# Patient Record
Sex: MALE | Race: WHITE | NOT HISPANIC OR LATINO | URBAN - METROPOLITAN AREA
[De-identification: names, ages, dates, MRNs, and addresses within clinical notes are randomized per-mention and may not be internally consistent; named-entity substitution may affect disease eponyms.]

---

## 2021-07-16 ENCOUNTER — IMMUNIZATION (OUTPATIENT)
Dept: FAMILY MEDICINE | Facility: CLINIC | Age: 46
End: 2021-07-16
Payer: COMMERCIAL

## 2021-07-16 DIAGNOSIS — Z23 NEED FOR VACCINATION: Primary | ICD-10-CM

## 2021-07-16 PROCEDURE — 0001A COVID-19, MRNA, LNP-S, PF, 30 MCG/0.3 ML DOSE VACCINE: CPT | Mod: PBBFAC | Performed by: FAMILY MEDICINE

## 2021-08-06 ENCOUNTER — IMMUNIZATION (OUTPATIENT)
Dept: FAMILY MEDICINE | Facility: CLINIC | Age: 46
End: 2021-08-06
Payer: COMMERCIAL

## 2021-08-06 DIAGNOSIS — Z23 NEED FOR VACCINATION: Primary | ICD-10-CM

## 2021-08-06 PROCEDURE — 0002A COVID-19, MRNA, LNP-S, PF, 30 MCG/0.3 ML DOSE VACCINE: CPT | Mod: CV19,,, | Performed by: FAMILY MEDICINE

## 2021-08-06 PROCEDURE — 0002A COVID-19, MRNA, LNP-S, PF, 30 MCG/0.3 ML DOSE VACCINE: ICD-10-PCS | Mod: CV19,,, | Performed by: FAMILY MEDICINE

## 2021-08-06 PROCEDURE — 91300 COVID-19, MRNA, LNP-S, PF, 30 MCG/0.3 ML DOSE VACCINE: CPT | Mod: ,,, | Performed by: FAMILY MEDICINE

## 2021-08-06 PROCEDURE — 91300 COVID-19, MRNA, LNP-S, PF, 30 MCG/0.3 ML DOSE VACCINE: ICD-10-PCS | Mod: ,,, | Performed by: FAMILY MEDICINE

## 2022-07-08 ENCOUNTER — OFFICE VISIT (OUTPATIENT)
Dept: FAMILY MEDICINE | Facility: CLINIC | Age: 47
End: 2022-07-08
Payer: COMMERCIAL

## 2022-07-08 ENCOUNTER — CLINICAL SUPPORT (OUTPATIENT)
Dept: FAMILY MEDICINE | Facility: CLINIC | Age: 47
End: 2022-07-08
Payer: COMMERCIAL

## 2022-07-08 VITALS
SYSTOLIC BLOOD PRESSURE: 126 MMHG | HEIGHT: 70 IN | TEMPERATURE: 98 F | RESPIRATION RATE: 16 BRPM | WEIGHT: 179.63 LBS | BODY MASS INDEX: 25.72 KG/M2 | HEART RATE: 56 BPM | OXYGEN SATURATION: 97 % | DIASTOLIC BLOOD PRESSURE: 72 MMHG

## 2022-07-08 DIAGNOSIS — Z82.49 FAMILY HISTORY OF HEART DISEASE: ICD-10-CM

## 2022-07-08 DIAGNOSIS — K21.9 GASTROESOPHAGEAL REFLUX DISEASE, UNSPECIFIED WHETHER ESOPHAGITIS PRESENT: ICD-10-CM

## 2022-07-08 DIAGNOSIS — Z13.6 ENCOUNTER FOR LIPID SCREENING FOR CARDIOVASCULAR DISEASE: ICD-10-CM

## 2022-07-08 DIAGNOSIS — R53.83 FATIGUE, UNSPECIFIED TYPE: ICD-10-CM

## 2022-07-08 DIAGNOSIS — Z79.899 ENCOUNTER FOR LONG-TERM (CURRENT) USE OF MEDICATIONS: ICD-10-CM

## 2022-07-08 DIAGNOSIS — K80.20 CALCULUS OF GALLBLADDER WITHOUT CHOLECYSTITIS WITHOUT OBSTRUCTION: ICD-10-CM

## 2022-07-08 DIAGNOSIS — R94.31 ABNORMAL EKG: ICD-10-CM

## 2022-07-08 DIAGNOSIS — Z00.00 ENCOUNTER FOR MEDICAL EXAMINATION TO ESTABLISH CARE: ICD-10-CM

## 2022-07-08 DIAGNOSIS — Z00.00 ENCOUNTER FOR MEDICAL EXAMINATION TO ESTABLISH CARE: Primary | ICD-10-CM

## 2022-07-08 DIAGNOSIS — Z13.220 ENCOUNTER FOR LIPID SCREENING FOR CARDIOVASCULAR DISEASE: ICD-10-CM

## 2022-07-08 DIAGNOSIS — B35.1 ONYCHOMYCOSIS: ICD-10-CM

## 2022-07-08 DIAGNOSIS — R93.89 ABNORMAL CT SCAN: ICD-10-CM

## 2022-07-08 DIAGNOSIS — Z12.11 COLON CANCER SCREENING: ICD-10-CM

## 2022-07-08 DIAGNOSIS — Z12.5 ENCOUNTER FOR PROSTATE CANCER SCREENING: ICD-10-CM

## 2022-07-08 LAB
BILIRUB UR QL STRIP: NEGATIVE
CLARITY UR: CLEAR
COLOR UR: YELLOW
GLUCOSE UR QL STRIP: NEGATIVE
HGB UR QL STRIP: NEGATIVE
KETONES UR QL STRIP: NEGATIVE
LEUKOCYTE ESTERASE UR QL STRIP: NEGATIVE
NITRITE UR QL STRIP: NEGATIVE
PH UR STRIP: 6 [PH] (ref 5–8)
PROT UR QL STRIP: NEGATIVE
SP GR UR STRIP: 1.02 (ref 1–1.03)
URN SPEC COLLECT METH UR: NORMAL

## 2022-07-08 PROCEDURE — 3074F PR MOST RECENT SYSTOLIC BLOOD PRESSURE < 130 MM HG: ICD-10-PCS | Mod: CPTII,S$GLB,, | Performed by: FAMILY MEDICINE

## 2022-07-08 PROCEDURE — 99204 OFFICE O/P NEW MOD 45 MIN: CPT | Mod: S$GLB,,, | Performed by: FAMILY MEDICINE

## 2022-07-08 PROCEDURE — 99999 PR PBB SHADOW E&M-EST. PATIENT-LVL I: CPT | Mod: PBBFAC,,,

## 2022-07-08 PROCEDURE — 1160F PR REVIEW ALL MEDS BY PRESCRIBER/CLIN PHARMACIST DOCUMENTED: ICD-10-PCS | Mod: CPTII,S$GLB,, | Performed by: FAMILY MEDICINE

## 2022-07-08 PROCEDURE — 93005 ELECTROCARDIOGRAM TRACING: CPT | Mod: S$GLB,,, | Performed by: FAMILY MEDICINE

## 2022-07-08 PROCEDURE — 99999 PR PBB SHADOW E&M-EST. PATIENT-LVL I: ICD-10-PCS | Mod: PBBFAC,,,

## 2022-07-08 PROCEDURE — 99999 PR PBB SHADOW E&M-EST. PATIENT-LVL V: CPT | Mod: PBBFAC,,, | Performed by: FAMILY MEDICINE

## 2022-07-08 PROCEDURE — 3078F DIAST BP <80 MM HG: CPT | Mod: CPTII,S$GLB,, | Performed by: FAMILY MEDICINE

## 2022-07-08 PROCEDURE — 81003 URINALYSIS AUTO W/O SCOPE: CPT | Mod: PO | Performed by: FAMILY MEDICINE

## 2022-07-08 PROCEDURE — 99204 PR OFFICE/OUTPT VISIT, NEW, LEVL IV, 45-59 MIN: ICD-10-PCS | Mod: S$GLB,,, | Performed by: FAMILY MEDICINE

## 2022-07-08 PROCEDURE — 1159F PR MEDICATION LIST DOCUMENTED IN MEDICAL RECORD: ICD-10-PCS | Mod: CPTII,S$GLB,, | Performed by: FAMILY MEDICINE

## 2022-07-08 PROCEDURE — 93005 EKG 12-LEAD: ICD-10-PCS | Mod: S$GLB,,, | Performed by: FAMILY MEDICINE

## 2022-07-08 PROCEDURE — 3008F BODY MASS INDEX DOCD: CPT | Mod: CPTII,S$GLB,, | Performed by: FAMILY MEDICINE

## 2022-07-08 PROCEDURE — 3074F SYST BP LT 130 MM HG: CPT | Mod: CPTII,S$GLB,, | Performed by: FAMILY MEDICINE

## 2022-07-08 PROCEDURE — 99999 PR PBB SHADOW E&M-EST. PATIENT-LVL V: ICD-10-PCS | Mod: PBBFAC,,, | Performed by: FAMILY MEDICINE

## 2022-07-08 PROCEDURE — 1160F RVW MEDS BY RX/DR IN RCRD: CPT | Mod: CPTII,S$GLB,, | Performed by: FAMILY MEDICINE

## 2022-07-08 PROCEDURE — 93010 EKG 12-LEAD: ICD-10-PCS | Mod: S$GLB,,, | Performed by: INTERNAL MEDICINE

## 2022-07-08 PROCEDURE — 1159F MED LIST DOCD IN RCRD: CPT | Mod: CPTII,S$GLB,, | Performed by: FAMILY MEDICINE

## 2022-07-08 PROCEDURE — 3008F PR BODY MASS INDEX (BMI) DOCUMENTED: ICD-10-PCS | Mod: CPTII,S$GLB,, | Performed by: FAMILY MEDICINE

## 2022-07-08 PROCEDURE — 93010 ELECTROCARDIOGRAM REPORT: CPT | Mod: S$GLB,,, | Performed by: INTERNAL MEDICINE

## 2022-07-08 PROCEDURE — 3078F PR MOST RECENT DIASTOLIC BLOOD PRESSURE < 80 MM HG: ICD-10-PCS | Mod: CPTII,S$GLB,, | Performed by: FAMILY MEDICINE

## 2022-07-08 RX ORDER — TERBINAFINE HYDROCHLORIDE 250 MG/1
250 TABLET ORAL DAILY
Qty: 30 TABLET | Refills: 2 | Status: ON HOLD | OUTPATIENT
Start: 2022-07-08 | End: 2022-08-09 | Stop reason: HOSPADM

## 2022-07-08 RX ORDER — ASPIRIN 81 MG/1
81 TABLET ORAL DAILY
Qty: 90 TABLET | Refills: 3 | Status: SHIPPED | OUTPATIENT
Start: 2022-07-08 | End: 2023-07-08

## 2022-07-08 NOTE — PATIENT INSTRUCTIONS
Follow up in about 1 year (around 7/8/2023), or if symptoms worsen or fail to improve.     Dear patient,   As a result of recent federal legislation (The Federal Cures Act), you may receive lab or pathology results from your visit in your [facility] account before your physician is able to contact you. Your physician or their representative will relay the results to you with their recommendations at their soonest availability.     If no improvement in symptoms or symptoms worsen, please be advised to call MD, follow-up at clinic and/or go to ER if becomes severe.    Reuben Garcia M.D.        We Offer TELEHEALTH & Same Day Appointments!   Book your Telehealth appointment with me through my nurse or   Clinic appointments on [employer]!    [address] [place] [address]    Office: [phone]   FAX: [phone]    Check out my Facebook Page and Follow Me at: [email or web address]    Check out my website at [facility] by clicking on: [email or web address]    To Schedule appointments online, go to [email or web address]: [email or web address]

## 2022-07-08 NOTE — PROGRESS NOTES
Please call the patient with the results. [phone]  Your urinalysis is normal.      If you have any other tests that were ordered we will notify you once they are available.  Please let me know if you have any questions concerning this test.  We will discuss further at your next office appointment.    Also please see below for health maintenance items that are due so we may discuss those at your next office visit:    Hepatitis C Screening Never done  Lipid Panel Never done  HIV Screening Never done  TETANUS VACCINE Never done  Colorectal Cancer Screening Never done  COVID-19 Vaccine(3 - Booster for Pfizer series) due on 01/06/2022

## 2022-07-08 NOTE — PROGRESS NOTES
This note is specifically for wellness visit performed today.   WELLNESS EXAM    Patient ID: Ranjith Holley is a 47 y.o. male.  has no past medical history on file.   Chief Complaint:  Encounter for wellness exam    Well Adult Physical: Patient here for a comprehensive physical exam.The patient reports chronic problems.  New patient.     Reviewed care team:   Has not had a PCP since a child  Gen surg Yovani Almeida MD  - was seen for episode of gallstones.  Patient did not have cholecystectomy.   urology in [facility] - vasectomy     Strong Family history of CAD.  EKG performed today is abnormal.  Patient denies any symptoms of chest pain palpitation shortness of breath.  He has been more fatigued lately.  Patient reports that he has not been working out.  He does feel better with intermittent fasting.    GERD:  Chronic.  Stable.  Patient on Nexium over-the-counter.  Reports compliance.  No side effects reported.  Symptoms are controlled.    Toenail fungus:  Patient had trauma to his nail.  Has been having issues ever since.    Elevated liver enzymes over a year ago during ER visit for cholecystitis.  Reviewed outside records:  MDM  Number of Diagnoses or Management Options  Risk of Complications, Morbidity, and/or Mortality  General comments: CBC unremarkable. Chemistry shows mild transaminitis but no evidence of obstructive biliary pattern. Lipase is normal. Urinalysis unremarkable. Patient was given intravenous pain medication with improvement of symptoms. CT showed gallstones and possible wall thickening but no other acute abnormalities. Ultrasound showed equivocal evidence for cholecystitis and given the lack of elevated white count and lack of fever and lack of obstructive pattern and negative Frank sign this is considered extremely unlikely. Patient will be treated for biliary colic with appropriate medication and dietary modification. I have referred patient to follow-up with general surgery for further  evaluation. I have discussed all diagnostic testing and results with the patient. Patient states they are comfortable with the diagnosis, instructions, prescriptions, and plan. Patient states they will follow up as directed. Patient has been advised to return to the emergency department as needed if worse or not better or for any other symptoms that may arise.    Patient Progress  Patient progress: stable      Medication List     START taking these medications   benzonatate 100 MG Cap capsule  Commonly known as: Tessalon Perles  Take 1 capsule (100 mg total) by mouth 3 (three) times daily as needed for Cough for up to 7 days    esomeprazole 20 MG Cpdr capsule  Commonly known as: NexIUM  Take 1 capsule (20 mg total) by mouth every morning before breakfast    HYDROcodone-acetaminophen 5-325 mg Tab per tablet  Commonly known as: NORCO  Take 1 tablet by mouth every 6 (six) hours as needed for Pain for up to 10 days    ondansetron 4 MG Tbdi disintegrating tablet  Commonly known as: Zofran ODT  Take 1 tablet (4 mg total) by mouth every 8 (eight) hours as needed for Nausea        Where to Get Your Medications     You can get these medications from any pharmacy   Bring a paper prescription for each of these medications  · benzonatate 100 MG Cap capsule  · esomeprazole 20 MG Cpdr capsule  · HYDROcodone-acetaminophen 5-325 mg Tab per tablet  · ondansetron 4 MG Tbdi disintegrating tablet      ED Critical Care Time    Heart Score    Diagnosis:    Final diagnoses:   Biliary colic       Jim Collins MD  01/14/21 0211              Do you take any herbs or supplements that were not prescribed by a doctor? Yes, MVI, fish oil, b complex, saw palmetto,    Are you taking calcium supplements? no      History: having decreased libido  UROLOGIST:     Date last PSA: No results found for: PSA     The natural history of prostate cancer and ongoing controversy regarding screening and potential treatment outcomes of prostate  cancer has been discussed with the patient. The meaning of a false positive PSA and a false negative PSA has been discussed. He indicates understanding of the limitations of this screening test and wishes to proceed with screening PSA testing.    No results found for: TESTOSTERONE No results found for: TESTOSTERONE, TOTALTESTOST, BIOTESTO     Colon cancer screening: due for colonoscopy.   Health Maintenance Topics with due status: Not Due       Topic Last Completion Date    Influenza Vaccine Not Due      ==============================================  History reviewed.   Health Maintenance Due   Topic Date Due    Hepatitis C Screening  Never done    Lipid Panel  Never done    HIV Screening  Never done    TETANUS VACCINE  Never done    Colorectal Cancer Screening  Never done    COVID-19 Vaccine (3 - Booster for Pfizer series) 01/06/2022       Past Medical History:  History reviewed. No pertinent past medical history.  Past Surgical History:   Procedure Laterality Date    HERNIA REPAIR  1976    Unsure i was either 1 or 2 years old     Review of patient's allergies indicates:  No Known Allergies  Current Outpatient Medications on File Prior to Visit   Medication Sig Dispense Refill    esomeprazole magnesium (NEXIUM 24HR ORAL) Take 1 capsule by mouth once daily at 6am.       No current facility-administered medications on file prior to visit.     Social History     Socioeconomic History    Marital status:    Tobacco Use    Smoking status: Never Smoker    Smokeless tobacco: Never Used   Substance and Sexual Activity    Alcohol use: Yes     Alcohol/week: 6.0 standard drinks     Types: 2 Glasses of wine, 2 Cans of beer, 2 Shots of liquor per week     Comment: Usually 1 drink a night    Drug use: Never    Sexual activity: Yes     Partners: Female     Birth control/protection: Partner-Vasectomy     Family History   Problem Relation Age of Onset    Heart disease Maternal Grandfather     Stroke Maternal  Grandfather     Heart disease Paternal Grandfather     Arthritis Paternal Grandmother     Arthritis Paternal Aunt     Mental retardation Paternal Uncle         Late in life Pregnancy       Review of Systems   Constitutional: Positive for fatigue. Negative for chills, fever and unexpected weight change.   HENT: Negative for ear pain and sore throat.    Eyes: Negative for redness and visual disturbance.   Respiratory: Negative for cough and shortness of breath.    Cardiovascular: Negative for chest pain and palpitations.   Gastrointestinal: Negative for abdominal pain, constipation, diarrhea, nausea and vomiting.   Endocrine: Negative for cold intolerance and heat intolerance.   Genitourinary: Positive for frequency. Negative for difficulty urinating, dysuria, enuresis, flank pain and hematuria.   Musculoskeletal: Negative for arthralgias and myalgias.   Skin: Negative for rash and wound.   Allergic/Immunologic: Negative for environmental allergies and food allergies.   Neurological: Negative for weakness and headaches.   Hematological: Negative for adenopathy. Does not bruise/bleed easily.   Psychiatric/Behavioral: Negative for sleep disturbance. The patient is not nervous/anxious.         Objective:     Vitals:    07/08/22 0813   BP: 126/72   Pulse: (!) 56   Resp: 16   Temp: 97.7 °F (36.5 °C)    Body mass index is 25.77 kg/m².  Physical Exam  Vitals and nursing note reviewed.   Constitutional:       General: He is not in acute distress.     Appearance: He is well-developed. He is not diaphoretic.   HENT:      Head: Normocephalic and atraumatic.      Right Ear: External ear normal.      Left Ear: External ear normal.      Nose: Nose normal. No rhinorrhea.   Eyes:      Extraocular Movements: Extraocular movements intact.      Pupils: Pupils are equal, round, and reactive to light.   Neck:      Vascular: No carotid bruit.   Cardiovascular:      Rate and Rhythm: Normal rate.      Pulses: Normal pulses.   Pulmonary:       Effort: Pulmonary effort is normal. No respiratory distress.      Breath sounds: Normal breath sounds.   Abdominal:      General: Bowel sounds are normal.      Palpations: Abdomen is soft.      Tenderness: There is no right CVA tenderness or left CVA tenderness.   Musculoskeletal:         General: Normal range of motion.      Cervical back: Normal range of motion and neck supple.   Skin:     General: Skin is warm and dry.      Capillary Refill: Capillary refill takes less than 2 seconds.      Findings: No rash.   Neurological:      General: No focal deficit present.      Mental Status: He is alert and oriented to person, place, and time.      Cranial Nerves: No cranial nerve deficit.      Motor: No weakness.      Gait: Gait normal.   Psychiatric:         Attention and Perception: He is attentive.         Mood and Affect: Mood normal. Mood is not anxious or depressed. Affect is not labile, blunt, angry or inappropriate.         Speech: He is communicative. Speech is not rapid and pressured, delayed, slurred or tangential.         Behavior: Behavior normal. Behavior is not agitated, slowed, aggressive, withdrawn, hyperactive or combative.         Thought Content: Thought content normal. Thought content is not paranoid or delusional. Thought content does not include homicidal or suicidal ideation. Thought content does not include homicidal or suicidal plan.         Cognition and Memory: Memory is not impaired.         Judgment: Judgment normal. Judgment is not impulsive or inappropriate.          No results found for any previous visit.      This result has an attachment that is not available.   REASON FOR EXAM: Abdominal distension     TECHNICAL FACTORS: Multiple contiguous axial CT images were obtained of the abdomen and pelvis after administration of intravenous contrast. 2D reformatted images were performed. Automated exposure control was utilized for radiation dose reduction.     COMPARISON: None     ABDOMEN  FINDINGS: The visualized lung bases are clear.  2.7 cm generally low attenuation ill-defined nodule is noted in the right lobe of the liver with patchy peripheral enhancement which probably reflects a benign hemangioma, although evaluation is   limited without delayed images. A 2 mm nonobstructing calculus is noted in the lower pole collecting system of the left kidney. The liver, spleen, pancreas, kidneys and adrenal glands are otherwise unremarkable. There is a roughly 4 cm segment of focal   wall thickening of the colon as seen on image 34 series 2 and image 23 of series 601 which may be an artifact from peristalsis, but a focal neoplastic or inflammatory lesion cannot be excluded. Bowel and mesentery are otherwise unremarkable. There is no   evidence of free fluid or lymphadenopathy.  Small Schmorl's node is noted in the T12 superior endplate of indeterminate age. Cholelithiasis is noted with possible slight adjacent wall thickening of the gallbladder. Correlation with gallbladder ultrasound   is recommended. There is apparent mild wall thickening in the region of the pylorus. Peptic ulcer disease cannot be excluded.     PELVIS FINDINGS: The prostate gland is mildly enlarged with nonspecific calcifications. Urinary bladder is unremarkable. The appendix is slightly distended, measuring 8 mm in diameter, but there is no wall thickening of the appendix or adjacent   inflammatory changes and appendicitis is therefore considered very unlikely.     Patchy wall thickening of the rectosigmoid colon may be an artifact from peristalsis, but endoscopy is recommended for correlation. There is no evidence of free fluid or lymphadenopathy. Osseous structures are unremarkable.     IMPRESSION:    1.  Apparent mild wall thickening of the gastric pylorus and mild gastric distention. Peptic ulcer disease cannot be excluded.    2.  Cholelithiasis with possible slight wall thickening of the adjacent gallbladder. Correlation with  gallbladder ultrasound is recommended.   3. Minimal nonobstructing left nephrolithiasis.   4. Patchy wall thickening of the colon in the rectosigmoid region and distal transverse colon is probably an artifact secondary to peristalsis, but patchy colitis or other inflammatory or less likely neoplastic process cannot be excluded. Colonoscopy   might be helpful in correlation if clinically warranted.       Code A     Electronically signed by Ranjith Wise MD on 1/14/2021 8:47 AM    Assessment / Plan:    1.  Routine health exam-patient here for annual wellness exam.  Labs ordered.  Health maintenance was reviewed and ordered.  Anticipatory guidance: Don't smoke.  Healthy diet and regular exercise recommended. Vaccine recommendations discussed.  See orders.  Reviewed Anticipatory guidance, risk factor reduction interventions or counseling as needed, Complete history , physical was completed today.  Complete and thorough medication reconciliation was performed.  Discussed risks and benefits of medications.  Advised patient on orders and health maintenance.  We discussed old records and old labs if available.  Will request any records not available through epic.  Continue current medications listed on your summary sheet.  All questions were answered. Patient had no further concerns. Advised of diagnoses and plan. Follow up as planned or return sooner if symptoms persist or worsen.     Onychomycosis.  Check liver enzymes 1st.  If they have normalized then he can proceed with Lamisil orally.  Discussed risk and benefits of medication use.  He can take this medication for three months.  Follow-up if no improvement.  Elevated liver enzymes related to cholecystitis:  Patient has not had another attack since last year.  Patient was seen in the ER and had abnormal ultrasound and CT scan.  He followed up with General surgery but never had his gallbladder removed.  Cholecystitis precautions.  Decreased libido, fatigue:  Check  testosterone levels.  Discussed lifestyle modification with diet and exercise.  Strong family history of coronary artery disease:  Abnormal EKG today from with borderline LVH.  I recommend patient establish care with Cardiology for further evaluation and treatment.    GERD RECOMMENDATIONS  Please be advised of condition course.  - Take PPI in the morning 30-60 minutes before breakfast  - I recommend ongoing Education for lifestyle modifications to help control/reduce reflux/abdominal pain including: avoid large meals, avoid eating within 2-3 hours of bedtime (avoid late night eating & lying down soon after eating), elevate head of bed if nocturnal symptoms are present, smoking cessation (if current smoker), & weight loss (if overweight).   - please be advised to avoid known foods which trigger reflux symptoms & to minimize/avoid high-fat foods, chocolate, caffeine, citrus, alcohol, & tomato products.  - Advised to avoid/limit use of NSAID's, since they can cause GI upset, bleeding, and/or ulcers. If needed, take with food.     Update labs.    Update colonoscopy.  Discussed different testing modalities.  Patient agrees and understands risk and benefits of colonoscopy.  Abnormal CT of the abdomen:  Reviewed abnormalities.  Patient will follow-up with general surgery if having any issues with gallbladder or ER if severe.  Strongly recommend patient update colonoscopy.  Follow-up with gastroenterology if needing EGD for uncontrolled reflux.    Minimal nonobstructing nephrolithiasis seen on CT.  No issues.  Will monitor.  Check urinalysis.    Orders Placed This Encounter   Procedures    Testosterone     Standing Status:   Future     Number of Occurrences:   1     Standing Expiration Date:   9/6/2023    PSA, Screening     Standing Status:   Future     Number of Occurrences:   1     Standing Expiration Date:   9/6/2023    Urinalysis     Standing Status:   Future     Number of Occurrences:   1     Standing Expiration  Date:   9/6/2023    Lipid Panel     Standing Status:   Future     Number of Occurrences:   1     Standing Expiration Date:   9/6/2023    Hemoglobin A1C     Standing Status:   Future     Number of Occurrences:   1     Standing Expiration Date:   9/6/2023    TSH     Standing Status:   Future     Number of Occurrences:   1     Standing Expiration Date:   9/6/2023    Comprehensive Metabolic Panel     Standing Status:   Future     Number of Occurrences:   1     Standing Expiration Date:   9/6/2023    CBC Without Differential     Standing Status:   Future     Number of Occurrences:   1     Standing Expiration Date:   9/6/2023    Urinalysis, Reflex to Urine Culture Urine, Clean Catch     Standing Status:   Future     Number of Occurrences:   1     Standing Expiration Date:   1/8/2024     Order Specific Question:   Preferred Collection Type     Answer:   Urine, Clean Catch     Order Specific Question:   Specimen Source     Answer:   Urine    Ambulatory referral/consult to Endo Procedure      Standing Status:   Future     Standing Expiration Date:   1/8/2023     Referral Priority:   Routine     Referral Type:   Consultation     Number of Visits Requested:   1    Ambulatory referral/consult to Cardiology     Standing Status:   Future     Standing Expiration Date:   8/8/2023     Referral Priority:   Routine     Referral Type:   Consultation     Referral Reason:   Specialty Services Required     Requested Specialty:   Cardiology     Number of Visits Requested:   1    IN OFFICE EKG 12-LEAD (to [place])     Standing Status:   Future     Number of Occurrences:   1     Standing Expiration Date:   7/8/2023       Medications Ordered This Encounter   Medications    aspirin (ECOTRIN) 81 MG EC tablet     Sig: Take 1 tablet (81 mg total) by mouth once daily.     Dispense:  90 tablet     Refill:  3    terbinafine HCL (LAMISIL) 250 mg tablet     Sig: Take 1 tablet (250 mg total) by mouth once daily. Take 1 po q day. Check  a liver profile in 30 days and use rest of meds if the labs are good.     Dispense:  30 tablet     Refill:  2      Future Appointments     Date Provider Specialty Appt Notes    7/11/2022  Pre-Admission Testing [phone]    8/3/2022 Dima Milan MD Cardiology Family history of heart disease [Z82.49]  Abnormal EKG [R94.31]         Future Appointments     Date Provider Specialty Appt Notes    7/11/2022  Pre-Admission Testing [phone]    8/3/2022 Dima Milan MD Cardiology Family history of heart disease [Z82.49]  Abnormal EKG [R94.31]          Reuben Garcia MD

## 2022-07-11 ENCOUNTER — HOSPITAL ENCOUNTER (OUTPATIENT)
Dept: PREADMISSION TESTING | Facility: HOSPITAL | Age: 47
Discharge: HOME | End: 2022-07-11
Attending: FAMILY MEDICINE
Payer: COMMERCIAL

## 2022-07-11 DIAGNOSIS — Z12.11 COLON CANCER SCREENING: Primary | ICD-10-CM

## 2022-07-11 RX ORDER — POLYETHYLENE GLYCOL 3350, SODIUM SULFATE ANHYDROUS, SODIUM BICARBONATE, SODIUM CHLORIDE, POTASSIUM CHLORIDE 236; 22.74; 6.74; 5.86; 2.97 G/4L; G/4L; G/4L; G/4L; G/4L
4 POWDER, FOR SOLUTION ORAL ONCE
Qty: 4000 ML | Refills: 0 | Status: SHIPPED | OUTPATIENT
Start: 2022-07-11 | End: 2022-07-11

## 2022-07-12 LAB
ALBUMIN SERPL-MCNC: 5.1 G/DL (ref 4–5)
ALBUMIN/GLOB SERPL: 3.2 {RATIO} (ref 1.2–2.2)
ALP SERPL-CCNC: 47 IU/L (ref 44–121)
ALT SERPL-CCNC: 42 IU/L (ref 0–44)
APPEARANCE UR: CLEAR
AST SERPL-CCNC: 21 IU/L (ref 0–40)
BILIRUB SERPL-MCNC: 0.9 MG/DL (ref 0–1.2)
BILIRUB UR QL STRIP: NEGATIVE
BUN SERPL-MCNC: 25 MG/DL (ref 6–24)
BUN/CREAT SERPL: 23 (ref 9–20)
CALCIUM SERPL-MCNC: 9.1 MG/DL (ref 8.7–10.2)
CHLORIDE SERPL-SCNC: 105 MMOL/L (ref 96–106)
CHOLEST SERPL-MCNC: 230 MG/DL (ref 100–199)
CO2 SERPL-SCNC: 23 MMOL/L (ref 20–29)
COLOR UR: YELLOW
CREAT SERPL-MCNC: 1.11 MG/DL (ref 0.76–1.27)
ERYTHROCYTE [DISTWIDTH] IN BLOOD BY AUTOMATED COUNT: 12.8 % (ref 11.6–15.4)
GLOBULIN SER CALC-MCNC: 1.6 G/DL (ref 1.5–4.5)
GLUCOSE SERPL-MCNC: 103 MG/DL (ref 65–99)
GLUCOSE UR QL STRIP: NEGATIVE
HBA1C MFR BLD: 5.3 % (ref 4.8–5.6)
HCT VFR BLD AUTO: 46 % (ref 37.5–51)
HDLC SERPL-MCNC: 41 MG/DL
HGB BLD-MCNC: 16 G/DL (ref 13–17.7)
HGB UR QL STRIP: NEGATIVE
KETONES UR QL STRIP: ABNORMAL
LDLC SERPL CALC-MCNC: 152 MG/DL (ref 0–99)
LEUKOCYTE ESTERASE UR QL STRIP: NEGATIVE
MCH RBC QN AUTO: 31.6 PG (ref 26.6–33)
MCHC RBC AUTO-ENTMCNC: 34.8 G/DL (ref 31.5–35.7)
MCV RBC AUTO: 91 FL (ref 79–97)
MICRO URNS: ABNORMAL
NITRITE UR QL STRIP: NEGATIVE
PH UR STRIP: 5.5 [PH] (ref 5–7.5)
PLATELET # BLD AUTO: 199 X10E3/UL (ref 150–450)
POTASSIUM SERPL-SCNC: 5 MMOL/L (ref 3.5–5.2)
PROT SERPL-MCNC: 6.7 G/DL (ref 6–8.5)
PROT UR QL STRIP: NEGATIVE
PSA SERPL-MCNC: 1.5 NG/ML (ref 0–4)
RBC # BLD AUTO: 5.06 X10E6/UL (ref 4.14–5.8)
SODIUM SERPL-SCNC: 144 MMOL/L (ref 134–144)
SP GR UR STRIP: 1.03 (ref 1–1.03)
TESTOST SERPL-MCNC: 422 NG/DL (ref 264–916)
TRIGL SERPL-MCNC: 203 MG/DL (ref 0–149)
TSH SERPL DL<=0.005 MIU/L-ACNC: 4.62 UIU/ML (ref 0.45–4.5)
UROBILINOGEN UR STRIP-MCNC: 0.2 MG/DL (ref 0.2–1)
VLDLC SERPL CALC-MCNC: 37 MG/DL (ref 5–40)
WBC # BLD AUTO: 6.9 X10E3/UL (ref 3.4–10.8)

## 2022-07-12 NOTE — PROGRESS NOTES
Make follow-up lab appointment per recommendation below.  Check to see if patient has seen the results through my chart.  If not then,  #CALL THE PATIENT# to discuss results/see if they have questions and document verification of contact. Make F/U appt if needed. [phone]    #My interpretation that was sent to them through [facility]:  Ranjith, I have reviewed your recent blood work.     PSA screening for prostate cancer is within normal limits.  Repeat annually.  Testosterone level is within normal limits.  Your complete blood count is normal, no anemia.    Your metabolic panel which shows your glucose, kidney function, electrolytes, and liver function is mostly within normal limits.   Thyroid study is borderline abnormal.  This could indicate hypothyroidism.  I recommend rechecking TSH with free T4 in a few weeks.  Your cholesterol is borderline high.   I recommend lifestyle modification with diet and exercise.  Your hemoglobin A1c is normal.  This test is gold standard screening test for diabetes.  It is a measures 3 months of your average blood sugar.    Recommendations as above.  Message me when you are ready to get blood work at [facility].  Otherwise recheck annual wellness labs in one year.  =========================  Also please address any outstanding health maintenance that may be due: Hepatitis C Screening Never done  HIV Screening Never done  TETANUS VACCINE Never done  Colorectal Cancer Screening Never done  COVID-19 Vaccine(3 - Booster for Pfizer series) due on 01/06/2022

## 2022-08-03 ENCOUNTER — OFFICE VISIT (OUTPATIENT)
Dept: CARDIOLOGY | Facility: CLINIC | Age: 47
End: 2022-08-03
Payer: COMMERCIAL

## 2022-08-03 VITALS
OXYGEN SATURATION: 98 % | HEIGHT: 70 IN | WEIGHT: 178 LBS | HEART RATE: 76 BPM | BODY MASS INDEX: 25.48 KG/M2 | DIASTOLIC BLOOD PRESSURE: 78 MMHG | SYSTOLIC BLOOD PRESSURE: 140 MMHG

## 2022-08-03 DIAGNOSIS — R94.31 ABNORMAL EKG: ICD-10-CM

## 2022-08-03 DIAGNOSIS — Z82.49 FAMILY HISTORY OF HEART DISEASE: ICD-10-CM

## 2022-08-03 DIAGNOSIS — R53.83 FATIGUE, UNSPECIFIED TYPE: Primary | Chronic | ICD-10-CM

## 2022-08-03 PROCEDURE — 99999 PR PBB SHADOW E&M-EST. PATIENT-LVL IV: CPT | Mod: PBBFAC,,, | Performed by: INTERNAL MEDICINE

## 2022-08-03 PROCEDURE — 1160F PR REVIEW ALL MEDS BY PRESCRIBER/CLIN PHARMACIST DOCUMENTED: ICD-10-PCS | Mod: CPTII,S$GLB,, | Performed by: INTERNAL MEDICINE

## 2022-08-03 PROCEDURE — 1159F MED LIST DOCD IN RCRD: CPT | Mod: CPTII,S$GLB,, | Performed by: INTERNAL MEDICINE

## 2022-08-03 PROCEDURE — 99205 OFFICE O/P NEW HI 60 MIN: CPT | Mod: S$GLB,,, | Performed by: INTERNAL MEDICINE

## 2022-08-03 PROCEDURE — 3078F DIAST BP <80 MM HG: CPT | Mod: CPTII,S$GLB,, | Performed by: INTERNAL MEDICINE

## 2022-08-03 PROCEDURE — 3008F PR BODY MASS INDEX (BMI) DOCUMENTED: ICD-10-PCS | Mod: CPTII,S$GLB,, | Performed by: INTERNAL MEDICINE

## 2022-08-03 PROCEDURE — 1159F PR MEDICATION LIST DOCUMENTED IN MEDICAL RECORD: ICD-10-PCS | Mod: CPTII,S$GLB,, | Performed by: INTERNAL MEDICINE

## 2022-08-03 PROCEDURE — 3077F PR MOST RECENT SYSTOLIC BLOOD PRESSURE >= 140 MM HG: ICD-10-PCS | Mod: CPTII,S$GLB,, | Performed by: INTERNAL MEDICINE

## 2022-08-03 PROCEDURE — 3044F HG A1C LEVEL LT 7.0%: CPT | Mod: CPTII,S$GLB,, | Performed by: INTERNAL MEDICINE

## 2022-08-03 PROCEDURE — 3078F PR MOST RECENT DIASTOLIC BLOOD PRESSURE < 80 MM HG: ICD-10-PCS | Mod: CPTII,S$GLB,, | Performed by: INTERNAL MEDICINE

## 2022-08-03 PROCEDURE — 3044F PR MOST RECENT HEMOGLOBIN A1C LEVEL <7.0%: ICD-10-PCS | Mod: CPTII,S$GLB,, | Performed by: INTERNAL MEDICINE

## 2022-08-03 PROCEDURE — 99205 PR OFFICE/OUTPT VISIT, NEW, LEVL V, 60-74 MIN: ICD-10-PCS | Mod: S$GLB,,, | Performed by: INTERNAL MEDICINE

## 2022-08-03 PROCEDURE — 3077F SYST BP >= 140 MM HG: CPT | Mod: CPTII,S$GLB,, | Performed by: INTERNAL MEDICINE

## 2022-08-03 PROCEDURE — 3008F BODY MASS INDEX DOCD: CPT | Mod: CPTII,S$GLB,, | Performed by: INTERNAL MEDICINE

## 2022-08-03 PROCEDURE — 99999 PR PBB SHADOW E&M-EST. PATIENT-LVL IV: ICD-10-PCS | Mod: PBBFAC,,, | Performed by: INTERNAL MEDICINE

## 2022-08-03 PROCEDURE — 1160F RVW MEDS BY RX/DR IN RCRD: CPT | Mod: CPTII,S$GLB,, | Performed by: INTERNAL MEDICINE

## 2022-08-03 RX ORDER — POLYETHYLENE GLYCOL-3350 AND ELECTROLYTES 236; 6.74; 5.86; 2.97; 22.74 G/274.31G; G/274.31G; G/274.31G; G/274.31G; G/274.31G
4000 POWDER, FOR SOLUTION ORAL ONCE
COMMUNITY
Start: 2022-07-11

## 2022-08-03 NOTE — PROGRESS NOTES
Subjective:   Patient ID:  Ranjith Holley is a 47 y.o. male who presents for follow-up of [facility]  Pt presents for CV evaluation with strong family hx of CAD.  Patient denies CP, angina or anginal equivalent.EKG NSR nonspecific st- t changes.      HPI    Review of Systems   Constitutional: Negative. Negative for weight gain.   HENT: Negative.    Eyes: Negative.    Cardiovascular: Negative.  Negative for chest pain, leg swelling and palpitations.   Respiratory: Negative.  Negative for shortness of breath.    Endocrine: Negative.    Hematologic/Lymphatic: Negative.    Skin: Negative.    Musculoskeletal: Negative for muscle weakness.   Gastrointestinal: Negative.    Genitourinary: Negative.    Neurological: Negative.  Negative for dizziness.   Psychiatric/Behavioral: Negative.    Allergic/Immunologic: Negative.      Family History   Problem Relation Age of Onset    Heart disease Maternal Grandfather     Stroke Maternal Grandfather     Heart disease Paternal Grandfather     Arthritis Paternal Grandmother     Arthritis Paternal Aunt     Mental retardation Paternal Uncle         Late in life Pregnancy     Past Medical History:   Diagnosis Date    Calculus of gallbladder without cholecystitis without obstruction 7/8/2022    Family history of heart disease 7/8/2022    Gastroesophageal reflux disease 7/8/2022     Social History     Socioeconomic History    Marital status:    Tobacco Use    Smoking status: Never Smoker    Smokeless tobacco: Never Used   Substance and Sexual Activity    Alcohol use: Yes     Alcohol/week: 6.0 standard drinks     Types: 2 Glasses of wine, 2 Cans of beer, 2 Shots of liquor per week     Comment: Usually 1 drink a night    Drug use: Never    Sexual activity: Yes     Partners: Female     Birth control/protection: Partner-Vasectomy     Current Outpatient Medications on File Prior to Visit   Medication Sig Dispense Refill    esomeprazole magnesium (NEXIUM 24HR ORAL)  Take 1 capsule by mouth once daily at 6am.      aspirin (ECOTRIN) 81 MG EC tablet Take 1 tablet (81 mg total) by mouth once daily. (Patient not taking: Reported on 8/3/2022) 90 tablet 3    GAVILYTE-G 236-22.74-6.74 -5.86 gram suspension Take 4,000 mLs by mouth once.      terbinafine HCL (LAMISIL) 250 mg tablet Take 1 tablet (250 mg total) by mouth once daily. Take 1 po q day. Check a liver profile in 30 days and use rest of meds if the labs are good. (Patient not taking: Reported on 8/3/2022) 30 tablet 2     No current facility-administered medications on file prior to visit.     Review of patient's allergies indicates:  No Known Allergies    Objective:     Physical Exam  Vitals and nursing note reviewed.   Constitutional:       Appearance: He is well-developed.   HENT:      Head: Normocephalic and atraumatic.   Eyes:      Conjunctiva/sclera: Conjunctivae normal.      Pupils: Pupils are equal, round, and reactive to light.   Cardiovascular:      Rate and Rhythm: Normal rate and regular rhythm.      Pulses: Intact distal pulses.      Heart sounds: Normal heart sounds.   Pulmonary:      Effort: Pulmonary effort is normal.      Breath sounds: Normal breath sounds.   Abdominal:      General: Bowel sounds are normal.      Palpations: Abdomen is soft.   Musculoskeletal:      Cervical back: Normal range of motion and neck supple.   Skin:     General: Skin is warm and dry.   Neurological:      Mental Status: He is alert and oriented to person, place, and time.         Assessment:     1. Fatigue, unspecified type    2. Family history of heart disease    3. Abnormal EKG        Plan:     Fatigue, unspecified type    Family history of heart disease  -     Ambulatory referral/consult to Cardiology    Abnormal EKG  -     Ambulatory referral/consult to Cardiology    stress test , echo

## 2022-08-09 ENCOUNTER — ANESTHESIA (OUTPATIENT)
Dept: ENDOSCOPY | Facility: HOSPITAL | Age: 47
End: 2022-08-09
Payer: COMMERCIAL

## 2022-08-09 ENCOUNTER — ANESTHESIA EVENT (OUTPATIENT)
Dept: ENDOSCOPY | Facility: HOSPITAL | Age: 47
End: 2022-08-09
Payer: COMMERCIAL

## 2022-08-09 ENCOUNTER — HOSPITAL ENCOUNTER (OUTPATIENT)
Facility: HOSPITAL | Age: 47
Discharge: HOME | End: 2022-08-09
Attending: FAMILY MEDICINE | Admitting: FAMILY MEDICINE
Payer: COMMERCIAL

## 2022-08-09 DIAGNOSIS — Z12.11 COLON CANCER SCREENING: Primary | ICD-10-CM

## 2022-08-09 DIAGNOSIS — K63.5 POLYP OF COLON, UNSPECIFIED PART OF COLON, UNSPECIFIED TYPE: ICD-10-CM

## 2022-08-09 DIAGNOSIS — Z83.719 FAMILY HISTORY OF COLONIC POLYPS: ICD-10-CM

## 2022-08-09 PROCEDURE — 88305 TISSUE EXAM BY PATHOLOGIST: CPT | Performed by: PATHOLOGY

## 2022-08-09 PROCEDURE — 88305 TISSUE EXAM BY PATHOLOGIST: CPT | Mod: 26,,, | Performed by: PATHOLOGY

## 2022-08-09 PROCEDURE — 37000009 HC ANESTHESIA EA ADD 15 MINS: Performed by: FAMILY MEDICINE

## 2022-08-09 PROCEDURE — 27201089 HC SNARE, DISP (ANY): Performed by: FAMILY MEDICINE

## 2022-08-09 PROCEDURE — 45385 COLONOSCOPY W/LESION REMOVAL: CPT | Mod: 33,,, | Performed by: FAMILY MEDICINE

## 2022-08-09 PROCEDURE — 25000003 PHARM REV CODE 250: Performed by: FAMILY MEDICINE

## 2022-08-09 PROCEDURE — 88305 TISSUE EXAM BY PATHOLOGIST: ICD-10-PCS | Mod: 26,,, | Performed by: PATHOLOGY

## 2022-08-09 PROCEDURE — 45385 PR COLONOSCOPY,REMV LESN,SNARE: ICD-10-PCS | Mod: 33,,, | Performed by: FAMILY MEDICINE

## 2022-08-09 PROCEDURE — 45385 COLONOSCOPY W/LESION REMOVAL: CPT | Performed by: FAMILY MEDICINE

## 2022-08-09 PROCEDURE — 25000003 PHARM REV CODE 250: Performed by: NURSE ANESTHETIST, CERTIFIED REGISTERED

## 2022-08-09 PROCEDURE — 63600175 PHARM REV CODE 636 W HCPCS: Performed by: NURSE ANESTHETIST, CERTIFIED REGISTERED

## 2022-08-09 PROCEDURE — 37000008 HC ANESTHESIA 1ST 15 MINUTES: Performed by: FAMILY MEDICINE

## 2022-08-09 RX ORDER — PROPOFOL 10 MG/ML
VIAL (ML) INTRAVENOUS
Status: DISCONTINUED | OUTPATIENT
Start: 2022-08-09 | End: 2022-08-09

## 2022-08-09 RX ORDER — DEXTROMETHORPHAN/PSEUDOEPHED 2.5-7.5/.8
DROPS ORAL
Status: COMPLETED | OUTPATIENT
Start: 2022-08-09 | End: 2022-08-09

## 2022-08-09 RX ORDER — LIDOCAINE HYDROCHLORIDE 10 MG/ML
INJECTION, SOLUTION EPIDURAL; INFILTRATION; INTRACAUDAL; PERINEURAL
Status: DISCONTINUED | OUTPATIENT
Start: 2022-08-09 | End: 2022-08-09

## 2022-08-09 RX ADMIN — SIMETHICONE 40 MG: 20 SUSPENSION/ DROPS ORAL at 07:08

## 2022-08-09 RX ADMIN — PROPOFOL 50 MG: 10 INJECTION, EMULSION INTRAVENOUS at 07:08

## 2022-08-09 RX ADMIN — PROPOFOL 30 MG: 10 INJECTION, EMULSION INTRAVENOUS at 07:08

## 2022-08-09 RX ADMIN — SODIUM CHLORIDE, POTASSIUM CHLORIDE, SODIUM LACTATE AND CALCIUM CHLORIDE: 600; 310; 30; 20 INJECTION, SOLUTION INTRAVENOUS at 07:08

## 2022-08-09 RX ADMIN — PROPOFOL 120 MG: 10 INJECTION, EMULSION INTRAVENOUS at 07:08

## 2022-08-09 RX ADMIN — LIDOCAINE HYDROCHLORIDE 50 MG: 10 INJECTION, SOLUTION EPIDURAL; INFILTRATION; INTRACAUDAL; PERINEURAL at 07:08

## 2022-08-09 NOTE — TRANSFER OF CARE
"Anesthesia Transfer of Care Note    Patient: Ranjith Holley    Procedure(s) Performed: Procedure(s) (LRB):  COLONOSCOPY (N/A)    Patient location: GI    Anesthesia Type: MAC    Transport from OR: Transported from OR on room air with adequate spontaneous ventilation    Post pain: adequate analgesia    Post assessment: no apparent anesthetic complications    Post vital signs: stable    Level of consciousness: awake    Nausea/Vomiting: no nausea/vomiting    Complications: none    Transfer of care protocol was followed      Last vitals:   Visit Vitals  /79 (BP Location: Left arm, Patient Position: Lying)   Pulse 60   Temp 36.7 °C (98.1 °F)   Resp 14   Ht 5' 10" (1.778 m)   Wt 77.1 kg (170 lb)   SpO2 97%   BMI 24.39 kg/m²     "

## 2022-08-09 NOTE — PLAN OF CARE
DR ROWE AT BEDSIDE TO SPEAK TO PT. REGARDING RESULTS.  VSS, NO GI BLEEDING, NO ABD. PAIN, NO N/V. PT. DISCHARGED FROM UNIT.

## 2022-08-09 NOTE — PROVATION PATIENT INSTRUCTIONS
Discharge Summary/Instructions after an Endoscopic Procedure  Patient Name: Ranjith Holley  Patient MRN: [id number]  Patient YOB: 1975 Tuesday, August 9, 2022 Deshaun Oscar MD  Dear patient,  As a result of recent federal legislation (The Federal Cures Act), you may   receive lab or pathology results from your procedure in your [facility]   account before your physician is able to contact you. Your physician or   their representative will relay the results to you with their   recommendations at their soonest availability.  Thank you,  RESTRICTIONS:  During your procedure today, you received medications for sedation.  These   medications may affect your judgment, balance and coordination.  Therefore,   for 24 hours, you have the following restrictions:   - DO NOT drive a car, operate machinery, make legal/financial decisions,   sign important papers or drink alcohol.    ACTIVITY:  Today: no heavy lifting, straining or running due to procedural   sedation/anesthesia.  The following day: return to full activity including work.  DIET:  Eat and drink normally unless instructed otherwise.     TREATMENT FOR COMMON SIDE EFFECTS:  - Mild abdominal pain, nausea, belching, bloating or excessive gas:  rest,   eat lightly and use a heating pad.  - Sore Throat: treat with throat lozenges and/or gargle with warm salt   water.  - Because air was used during the procedure, expelling large amounts of air   from your rectum or belching is normal.  - If a bowel prep was taken, you may not have a bowel movement for 1-3 days.    This is normal.  SYMPTOMS TO WATCH FOR AND REPORT TO YOUR PHYSICIAN:  1. Abdominal pain or bloating, other than gas cramps.  2. Chest pain.  3. Back pain.  4. Signs of infection such as: chills or fever occurring within 24 hours   after the procedure.  5. Rectal bleeding, which would show as bright red, maroon, or black stools.   (A tablespoon of blood from the rectum is not serious, especially if    hemorrhoids are present.)  6. Vomiting.  7. Weakness or dizziness.  GO DIRECTLY TO THE NEAREST EMERGENCY ROOM IF YOU HAVE ANY OF THE FOLLOWING:      Difficulty breathing              Chills and/or fever over 101 F   Persistent vomiting and/or vomiting blood   Severe abdominal pain   Severe chest pain   Black, tarry stools   Bleeding- more than one tablespoon   Any other symptom or condition that you feel may need urgent attention  Your doctor recommends these additional instructions:  If any biopsies were taken, your doctors clinic will contact you in 1 to 2   weeks with any results.  - Patient has a contact number available for emergencies.  The signs and   symptoms of potential delayed complications were discussed with the   patient.  Return to normal activities tomorrow.  Written discharge   instructions were provided to the patient.   - Resume previous diet.   - Continue present medications.   - Await pathology results.   - Repeat colonoscopy in 5 years for surveillance.   - Telephone my office for pathology results in 2 weeks.   - Discharge patient to home (via wheelchair).  For questions, problems or results please call your physician Deshaun Oscar MD at Work:  [phone]  If you have any questions about the above instructions, call the GI   department at [phone] or call the endoscopy unit at [phone]   from 7am until 3 pm.  [facility] [place], EMERGENCY ROOM PHONE NUMBER:   [phone]  IF A COMPLICATION OR EMERGENCY SITUATION ARISES AND YOU ARE UNABLE TO REACH   YOUR PHYSICIAN - GO DIRECTLY TO THE EMERGENCY ROOM.  I have read or have had read to me these discharge instructions for my   procedure and have received a written copy.  I understand these   instructions and will follow-up with my physician if I have any questions.     __________________________________       _____________________________________  Nurse Signature                                           Patient/Designated   Responsible Party Signature  Deshaun Oscar MD  8/9/2022 7:48:27 AM  This report has been verified and signed electronically.  Dear patient,  As a result of recent federal legislation (The Federal Cures Act), you may   receive lab or pathology results from your procedure in your [facility]   account before your physician is able to contact you. Your physician or   their representative will relay the results to you with their   recommendations at their soonest availability.  Thank you,  PROVATION

## 2022-08-09 NOTE — ANESTHESIA POSTPROCEDURE EVALUATION
Anesthesia Post Evaluation    Patient: Ranjith Holley    Procedure(s) Performed: Procedure(s) (LRB):  COLONOSCOPY (N/A)    Final Anesthesia Type: MAC      Patient location during evaluation: GI PACU  Patient participation: Yes- Able to Participate  Level of consciousness: awake and alert  Post-procedure vital signs: reviewed and stable  Pain management: adequate  Airway patency: patent  BYRON mitigation strategies: Multimodal analgesia  PONV status at discharge: No PONV  Anesthetic complications: no      Cardiovascular status: hemodynamically stable  Respiratory status: spontaneous ventilation and unassisted  Hydration status: euvolemic  Follow-up not needed.          Vitals Value Taken Time   /79 08/09/22 0749   Temp  08/09/22 0753   Pulse 60 08/09/22 0749   Resp 14 08/09/22 0749   SpO2 97 % 08/09/22 0749         No case tracking events are documented in the log.      Pain/Apple Score: No data recorded

## 2022-08-09 NOTE — H&P
Short Stay Endoscopy History and Physical    PCP - Reuben Garcia MD    Procedure - Colonoscopy  ASA - 2  Mallampati - per anesthesia  History of Anesthesia problems - no  Family history Anesthesia problems -  no     HPI:  This is a 47 y.o. male here for evaluation of :   Active Hospital Problems    Diagnosis  POA    *Colon cancer screening [Z12.11]  Not Applicable    Family history of colonic polyps [Z83.71]  Not Applicable     His father had colon polyps.        Resolved Hospital Problems   No resolved problems to display.         Health Maintenance       Date Due Completion Date    Hepatitis C Screening Never done ---    HIV Screening Never done ---    TETANUS VACCINE Never done ---    Colorectal Cancer Screening Never done ---    COVID-19 Vaccine (3 - Booster for Pfizer series) 01/06/2022 8/6/2021    Influenza Vaccine (1) 09/01/2022 ---    Lipid Panel 07/11/2027 7/11/2022          Screening - Yes  History of polyps - No     Diarrhea - no  Anemia - no  Blood in stools - no  Abdominal pain - no  Other - no    ROS:  CONSTITUTIONAL: Denies weight change,  fatigue, fevers, chills, night sweats.  CARDIOVASCULAR: Denies chest pain, shortness of breath, orthopnea and edema.  RESPIRATORY: Denies cough, hemoptysis, dyspnea, and wheezing.  GI: See HPI.    Medical History:   Past Medical History:   Diagnosis Date    Calculus of gallbladder without cholecystitis without obstruction 7/8/2022    Family history of heart disease 7/8/2022    Gastroesophageal reflux disease 7/8/2022       Surgical History:   Past Surgical History:   Procedure Laterality Date    HERNIA REPAIR  1976    Unsure i was either 1 or 2 years old       Family History:   Family History   Problem Relation Age of Onset    Heart disease Maternal Grandfather     Stroke Maternal Grandfather     Heart disease Paternal Grandfather     Arthritis Paternal Grandmother     Arthritis Paternal Aunt     Mental retardation Paternal Uncle         Late in life  Pregnancy       Social History:   Social History     Tobacco Use    Smoking status: Never Smoker    Smokeless tobacco: Never Used   Substance Use Topics    Alcohol use: Yes     Alcohol/week: 6.0 standard drinks     Types: 2 Glasses of wine, 2 Cans of beer, 2 Shots of liquor per week     Comment: Usually 1 drink a night    Drug use: Never       Allergies:   Review of patient's allergies indicates:  No Known Allergies    Medications:   No current facility-administered medications on file prior to encounter.     Current Outpatient Medications on File Prior to Encounter   Medication Sig Dispense Refill    esomeprazole magnesium (NEXIUM 24HR ORAL) Take 1 capsule by mouth once daily at 6am.      aspirin (ECOTRIN) 81 MG EC tablet Take 1 tablet (81 mg total) by mouth once daily. (Patient not taking: Reported on 8/3/2022) 90 tablet 3    terbinafine HCL (LAMISIL) 250 mg tablet Take 1 tablet (250 mg total) by mouth once daily. Take 1 po q day. Check a liver profile in 30 days and use rest of meds if the labs are good. 30 tablet 2       Physical Exam:  Vital Signs:   Vitals:    08/09/22 0704   BP: (!) 154/99   Pulse: 65   Resp: 19   Temp: 98.1 °F (36.7 °C)     General Appearance: Well appearing in no acute distress  ENT: OP clear  Chest: CTA B  CV: RRR, no m/r/g  Abd: s/nt/nd/nabs  Ext: no edema    Labs:Reviewed    IMP:  Active Hospital Problems    Diagnosis  POA    *Colon cancer screening [Z12.11]  Not Applicable    Family history of colonic polyps [Z83.71]  Not Applicable     His father had colon polyps.        Resolved Hospital Problems   No resolved problems to display.         Plan:   I have explained the risks and benefits of colonoscopy to the patient including but not limited to bleeding, perforation, infection, and death. The patient wishes to proceed.

## 2022-08-09 NOTE — ANESTHESIA PREPROCEDURE EVALUATION
08/09/2022  Ranjith Holley is a 47 y.o., male.      Pre-op Assessment    I have reviewed the Patient Summary Reports.     I have reviewed the Nursing Notes. I have reviewed the NPO Status.   I have reviewed the Medications.     Review of Systems  Anesthesia Hx:  No problems with previous Anesthesia    Social:  Non-Smoker, Social Alcohol Use    Hematology/Oncology:  Hematology Normal        EENT/Dental:EENT/Dental Normal   Cardiovascular:  Cardiovascular Normal  ECG has been reviewed. Normal sinus rhythm   Left axis deviation   Minimal voltage criteria for LVH, may be normal variant   Abnormal ECG    Pulmonary:  Pulmonary Normal    Hepatic/GI:   GERD, well controlled    Musculoskeletal:  Musculoskeletal Normal    Neurological:  Neurology Normal    Endocrine:  Endocrine Normal    Psych:  Psychiatric Normal           Physical Exam  General: Well nourished, Cooperative, Alert and Oriented    Airway:  Mallampati: II   Mouth Opening: Normal  TM Distance: Normal  Tongue: Normal  Neck ROM: Normal ROM    Dental:  Intact    Chest/Lungs:  Clear to auscultation    Heart:  Rate: Normal        Anesthesia Plan  Type of Anesthesia, risks & benefits discussed:    Anesthesia Type: MAC  Intra-op Monitoring Plan: Standard ASA Monitors  Post Op Pain Control Plan: multimodal analgesia  Induction:  IV  Informed Consent: Informed consent signed with the Patient and all parties understand the risks and agree with anesthesia plan.  All questions answered.   ASA Score: 2  Day of Surgery Review of History & Physical: H&P Update referred to the surgeon/provider.I have interviewed and examined the patient. I have reviewed the patient's H&P dated: There are no significant changes.     Ready For Surgery From Anesthesia Perspective.     .

## 2022-08-10 VITALS
OXYGEN SATURATION: 100 % | RESPIRATION RATE: 16 BRPM | SYSTOLIC BLOOD PRESSURE: 141 MMHG | TEMPERATURE: 98 F | HEART RATE: 57 BPM | BODY MASS INDEX: 24.34 KG/M2 | DIASTOLIC BLOOD PRESSURE: 99 MMHG | WEIGHT: 170 LBS | HEIGHT: 70 IN

## 2022-08-11 ENCOUNTER — HOSPITAL ENCOUNTER (OUTPATIENT)
Dept: CARDIOLOGY | Facility: HOSPITAL | Age: 47
Discharge: HOME | End: 2022-08-11
Attending: INTERNAL MEDICINE
Payer: COMMERCIAL

## 2022-08-11 VITALS
BODY MASS INDEX: 24.34 KG/M2 | HEART RATE: 66 BPM | SYSTOLIC BLOOD PRESSURE: 141 MMHG | WEIGHT: 170 LBS | DIASTOLIC BLOOD PRESSURE: 99 MMHG | HEIGHT: 70 IN

## 2022-08-11 DIAGNOSIS — R94.31 ABNORMAL EKG: ICD-10-CM

## 2022-08-11 DIAGNOSIS — R53.83 FATIGUE, UNSPECIFIED TYPE: ICD-10-CM

## 2022-08-11 LAB
AORTIC ROOT ANNULUS: 3 CM
ASCENDING AORTA: 2.68 CM
AV INDEX (PROSTH): 0.63
AV MEAN GRADIENT: 4 MMHG
AV PEAK GRADIENT: 8 MMHG
AV VALVE AREA: 2.93 CM2
AV VELOCITY RATIO: 0.65
BSA FOR ECHO PROCEDURE: 1.95 M2
CV ECHO LV RWT: 0.49 CM
DOP CALC AO PEAK VEL: 1.4 M/S
DOP CALC AO VTI: 27.2 CM
DOP CALC LVOT AREA: 4.6 CM2
DOP CALC LVOT DIAMETER: 2.43 CM
DOP CALC LVOT PEAK VEL: 0.91 M/S
DOP CALC LVOT STROKE VOLUME: 79.73 CM3
DOP CALC RVOT PEAK VEL: 1 M/S
DOP CALC RVOT VTI: 19.9 CM
DOP CALCLVOT PEAK VEL VTI: 17.2 CM
E WAVE DECELERATION TIME: 122.98 MSEC
E/A RATIO: 1.51
E/E' RATIO: 8.9 M/S
ECHO LV POSTERIOR WALL: 1.14 CM (ref 0.6–1.1)
EJECTION FRACTION: 60 %
FRACTIONAL SHORTENING: 33 % (ref 28–44)
INTERVENTRICULAR SEPTUM: 1.04 CM (ref 0.6–1.1)
IVRT: 99.9 MSEC
LA MAJOR: 5.64 CM
LA MINOR: 5.58 CM
LA WIDTH: 3.3 CM
LEFT ATRIUM SIZE: 3.68 CM
LEFT ATRIUM VOLUME INDEX MOD: 17.8 ML/M2
LEFT ATRIUM VOLUME INDEX: 29.7 ML/M2
LEFT ATRIUM VOLUME MOD: 34.69 CM3
LEFT ATRIUM VOLUME: 57.91 CM3
LEFT INTERNAL DIMENSION IN SYSTOLE: 3.13 CM (ref 2.1–4)
LEFT VENTRICLE DIASTOLIC VOLUME INDEX: 51.02 ML/M2
LEFT VENTRICLE DIASTOLIC VOLUME: 99.49 ML
LEFT VENTRICLE MASS INDEX: 93 G/M2
LEFT VENTRICLE SYSTOLIC VOLUME INDEX: 19.9 ML/M2
LEFT VENTRICLE SYSTOLIC VOLUME: 38.8 ML
LEFT VENTRICULAR INTERNAL DIMENSION IN DIASTOLE: 4.64 CM (ref 3.5–6)
LEFT VENTRICULAR MASS: 181.41 G
LV LATERAL E/E' RATIO: 8.9 M/S
LV SEPTAL E/E' RATIO: 8.9 M/S
LVOT MG: 1.7 MMHG
LVOT MV: 0.6 CM/S
MV PEAK A VEL: 0.59 M/S
MV PEAK E VEL: 0.89 M/S
MV STENOSIS PRESSURE HALF TIME: 35.66 MS
MV VALVE AREA P 1/2 METHOD: 6.17 CM2
PISA TR MAX VEL: 2.55 M/S
PULM VEIN S/D RATIO: 1.34
PV MEAN GRADIENT: 2.02 MMHG
PV PEAK D VEL: 0.38 M/S
PV PEAK S VEL: 0.51 M/S
PV PEAK VELOCITY: 1.7 CM/S
RA MAJOR: 4.21 CM
RA PRESSURE: 3 MMHG
RA WIDTH: 3.46 CM
RIGHT VENTRICULAR END-DIASTOLIC DIMENSION: 2.27 CM
SINUS: 2.59 CM
STJ: 2.88 CM
TDI LATERAL: 0.1 M/S
TDI SEPTAL: 0.1 M/S
TDI: 0.1 M/S
TR MAX PG: 26 MMHG
TRICUSPID ANNULAR PLANE SYSTOLIC EXCURSION: 2.43 CM
TV REST PULMONARY ARTERY PRESSURE: 29 MMHG

## 2022-08-11 PROCEDURE — 93306 TTE W/DOPPLER COMPLETE: CPT | Mod: 26,,, | Performed by: INTERNAL MEDICINE

## 2022-08-11 PROCEDURE — 93306 TTE W/DOPPLER COMPLETE: CPT | Mod: PO

## 2022-08-11 PROCEDURE — 93306 ECHO (CUPID ONLY): ICD-10-PCS | Mod: 26,,, | Performed by: INTERNAL MEDICINE

## 2022-08-12 ENCOUNTER — TELEPHONE (OUTPATIENT)
Dept: CARDIOLOGY | Facility: CLINIC | Age: 47
End: 2022-08-12
Payer: COMMERCIAL

## 2022-08-12 LAB
FINAL PATHOLOGIC DIAGNOSIS: NORMAL
GROSS: NORMAL
Lab: NORMAL

## 2022-08-12 NOTE — TELEPHONE ENCOUNTER
----- Message from Dima Milan MD sent at 8/11/2022  5:39 PM CDT -----  Please tell pt:  Echo shows normal systolic function

## 2022-08-13 NOTE — PROGRESS NOTES
Dear Reuben Garcia MD,    I recently cared for Ranjith Holley and performed an endoscopy.  Tissue was sent for pathology evaluation and I will have a letter written to ask the patient to repeat the colonoscopy in 5 years.  The pathology showed that there was adenomatous tissue present.  Thank you for allowing me to participate in the care of your patient.  Please call me for any questions that you might have.      Dr. Deshaun Oscar  [phone] cell  [phone] office      NURSING STAFF:Please  inform the patient that I reviewed the recent pathology obtained at the time of colonoscopy.    The results showed that there was adenomatous tissue present which is benign and based on that, I recommend that the patient have a repeat colonoscopy performed in 5 years.     If the patient has MyChart, this message has been sent to them.  Confirm that they read the note.  If not, copy the information and print a letter to send to the patient at this time.  Confirm that a notation to the PCP was done.      Dear Ranjith Holley,    This is to inform you that I have reviewed your recent colonoscopy pathology.  The results showed that you had adenomatous tissue present which is benign and based on that, I recommend that you have a repeat colonoscopy performed in 5 years.      Dr. Deshaun Oscar  [phone]

## 2022-08-17 ENCOUNTER — TELEPHONE (OUTPATIENT)
Dept: CARDIOLOGY | Facility: HOSPITAL | Age: 47
End: 2022-08-17
Payer: COMMERCIAL

## 2023-08-02 ENCOUNTER — PATIENT OUTREACH (OUTPATIENT)
Dept: ADMINISTRATIVE | Facility: HOSPITAL | Age: 48
End: 2023-08-02
Payer: COMMERCIAL

## 2023-08-02 ENCOUNTER — PATIENT MESSAGE (OUTPATIENT)
Dept: ADMINISTRATIVE | Facility: HOSPITAL | Age: 48
End: 2023-08-02
Payer: COMMERCIAL

## 2024-01-29 ENCOUNTER — PATIENT OUTREACH (OUTPATIENT)
Dept: ADMINISTRATIVE | Facility: HOSPITAL | Age: 49
End: 2024-01-29
Payer: COMMERCIAL

## 2024-01-29 NOTE — PROGRESS NOTES
Patient not seen by PCP in 12 months or longer Report: Called Pt to schedule labs & PCP visit, Pt accepts appt for May 3rd.

## 2024-05-18 ENCOUNTER — PATIENT MESSAGE (OUTPATIENT)
Dept: FAMILY MEDICINE | Facility: CLINIC | Age: 49
End: 2024-05-18
Payer: COMMERCIAL

## 2024-09-11 ENCOUNTER — PATIENT MESSAGE (OUTPATIENT)
Dept: FAMILY MEDICINE | Facility: CLINIC | Age: 49
End: 2024-09-11
Payer: COMMERCIAL

## 2025-06-09 ENCOUNTER — TELEPHONE (OUTPATIENT)
Dept: FAMILY MEDICINE | Facility: CLINIC | Age: 50
End: 2025-06-09
Payer: COMMERCIAL